# Patient Record
Sex: MALE | Race: BLACK OR AFRICAN AMERICAN | NOT HISPANIC OR LATINO | URBAN - METROPOLITAN AREA
[De-identification: names, ages, dates, MRNs, and addresses within clinical notes are randomized per-mention and may not be internally consistent; named-entity substitution may affect disease eponyms.]

---

## 2023-07-11 ENCOUNTER — HOSPITAL ENCOUNTER (EMERGENCY)
Facility: HOSPITAL | Age: 23
Discharge: HOME | End: 2023-07-11
Attending: SPECIALIST
Payer: MEDICAID

## 2023-07-11 VITALS
TEMPERATURE: 98 F | WEIGHT: 125 LBS | DIASTOLIC BLOOD PRESSURE: 82 MMHG | RESPIRATION RATE: 18 BRPM | OXYGEN SATURATION: 100 % | SYSTOLIC BLOOD PRESSURE: 124 MMHG | HEIGHT: 65 IN | HEART RATE: 84 BPM | BODY MASS INDEX: 20.83 KG/M2
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
## 2023-07-11 PROCEDURE — 25000003 PHARM REV CODE 250

## 2023-07-11 PROCEDURE — 99284 EMERGENCY DEPT VISIT MOD MDM: CPT | Mod: 25

## 2023-07-11 RX ORDER — KETOROLAC TROMETHAMINE 10 MG/1
10 TABLET, FILM COATED ORAL
Status: COMPLETED | OUTPATIENT
Start: 2023-07-11 | End: 2023-07-11

## 2023-07-11 RX ORDER — KETOROLAC TROMETHAMINE 10 MG/1
10 TABLET, FILM COATED ORAL EVERY 6 HOURS
Qty: 20 TABLET | Refills: 0 | Status: SHIPPED | OUTPATIENT
Start: 2023-07-11 | End: 2023-07-16

## 2023-07-11 RX ADMIN — KETOROLAC TROMETHAMINE 10 MG: 10 TABLET, FILM COATED ORAL at 06:07
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
## 2023-07-12 NOTE — DISCHARGE INSTRUCTIONS
Patient will be discharged home.  Instructed to rest it Toradol every 6 hours as needed for headache next dose at midnight.  Increase water intake.  ER for any worsening symptoms

## 2025-05-17 ENCOUNTER — HOSPITAL ENCOUNTER (EMERGENCY)
Facility: HOSPITAL | Age: 25
Discharge: HOME | End: 2025-05-17
Attending: EMERGENCY MEDICINE

## 2025-05-17 VITALS
HEART RATE: 79 BPM | HEIGHT: 70 IN | DIASTOLIC BLOOD PRESSURE: 99 MMHG | BODY MASS INDEX: 18.61 KG/M2 | SYSTOLIC BLOOD PRESSURE: 143 MMHG | RESPIRATION RATE: 16 BRPM | WEIGHT: 130 LBS | OXYGEN SATURATION: 98 % | TEMPERATURE: 98 F

## 2025-05-17 DIAGNOSIS — Z20.2 STD EXPOSURE: Primary | ICD-10-CM

## 2025-05-17 PROCEDURE — 25000003 PHARM REV CODE 250: Performed by: INTERNAL MEDICINE

## 2025-05-17 PROCEDURE — 99283 EMERGENCY DEPT VISIT LOW MDM: CPT

## 2025-05-17 RX ORDER — METRONIDAZOLE 500 MG/1
2000 TABLET ORAL
Status: COMPLETED | OUTPATIENT
Start: 2025-05-17 | End: 2025-05-17

## 2025-05-17 RX ADMIN — METRONIDAZOLE 2000 MG: 500 TABLET ORAL at 01:05

## 2025-05-17 NOTE — ED PROVIDER NOTES
05/17/2025         1:18 AM    Source of History:  History obtained from patient.     Chief complaint:  From Nurse Triage:  Exposure to STD (States had sexual intercourse a few days ago and was told by that partner that she tested pos. For trich. Wants treatment. )    HISTORY OF PRESENT ILLNES:  Josue Montejo is a 24 y.o. male  has no past medical history on file. presenting with Exposure to STD (States had sexual intercourse a few days ago and was told by that partner that she tested pos. For trich. Wants treatment. )  Patient has no symptoms    REVIEW OF SYSTEMS:   Constitutional symptoms:     Skin symptoms:      Eye symptoms:     ENMT symptoms:      Respiratory symptoms:      Cardiovascular symptoms:     Gastrointestinal symptoms:      Genitourinary symptoms:     Musculoskeletal symptoms:      Neurologic symptoms:      Psychiatric symptoms:               Additional review of systems information: Patient Denies Any Other Complaints.    All Other Systems Reviewed With Patient And Negative.    ALLEGIES:  Review of patient's allergies indicates:  No Known Allergies    MEDICINE LIST:  No current outpatient medications     PMH:  As per HPI and below:    Reviewed and updated in chart.    PAST MEDICAL HISTORY:  History reviewed. No pertinent past medical history.     PAST SURGICAL HISTORY:  History reviewed. No pertinent surgical history.    SOCIAL HISTORY:  Social History[1]    FAMILY HISTORY:  No family history on file.     PROBLEM LIST:  Problem List[2]     PHYSICAL EXAM:      ED Triage Vitals [05/17/25 0107]   BP (!) 143/99   Pulse 79   Resp 16   Temp 98 °F (36.7 °C)   SpO2 98 %        Vital Signs: Reviewed As In Chart.  General:  Alert, No Cardiorespiratory Distress Noted.  Skin: warm and dry  Eye:   Extraocular Movements Are Intact.   ENT: Mucus membranes are moist.   Cardiovascular:  Normal peripheral perfusion     Respiratory:  Normal respiratory rate    Gastrointestinal:  No distention    Neurological:  Alert  And Oriented To Person, Place, Time, And Situation, Normal Motor Observed, Normal Speech Observed.  Musculoskeletal:  No Gross Deformity Noted.     Psychiatric:  Cooperative.      ED WORKUP FOR MEDICAL DECISION MAKING:    ED ORDERS:  No orders of the defined types were placed in this encounter.      ED MEDICINES:  Medications   metroNIDAZOLE tablet 2,000 mg (has no administration in time range)                ED LABS ORDERED AND REVIEWED:  No visits with results within 1 Day(s) from this visit.   Latest known visit with results is:   No results found for any previous visit.       RADIOLOGY STUDIES ORDERED AND REVIEWED:  Imaging Results    None         MEDICAL DECISION MAKING:    Josue Montejo is 24 y.o. male who  has no past medical history on file. arrives in ER with c/o Exposure to STD (States had sexual intercourse a few days ago and was told by that partner that she tested pos. For trich. Wants treatment. )      Reviewed Nurses Note. Reviewed Vital Signs.     Reviewed Pertinent old records, History and updated as necessary.    Vitals:    05/17/25 0107   BP: (!) 143/99   Pulse: 79   Resp: 16   Temp: 98 °F (36.7 °C)        Medical Decision Making  Differential diagnosis includes was not limited to STD exposure, STD, Trichomonas    Risk  Prescription drug management.                        PROCEDURES PERFORMED IN ED:  Procedures    DIAGNOSTIC IMPRESSION:        ICD-10-CM ICD-9-CM   1. STD exposure  Z20.2 V01.6         ED Disposition Condition    Discharge Stable               Medication List      You have not been prescribed any medications.           Follow-up Information       Primary care physician In 2 days.                              ED Prescriptions    None       Follow-up Information       Follow up With Specialties Details Why Contact Info    Primary care physician  In 2 days                   [1]   Social History  Tobacco Use    Smoking status: Never    Smokeless tobacco: Never   Substance Use Topics     Alcohol use: Not Currently    Drug use: Not Currently   [2] There is no problem list on file for this patient.        Dmitri Payne MD  05/17/25 0119